# Patient Record
Sex: MALE | Race: WHITE | NOT HISPANIC OR LATINO | Employment: FULL TIME | URBAN - NONMETROPOLITAN AREA
[De-identification: names, ages, dates, MRNs, and addresses within clinical notes are randomized per-mention and may not be internally consistent; named-entity substitution may affect disease eponyms.]

---

## 2023-11-29 DIAGNOSIS — I10 ESSENTIAL HYPERTENSION: ICD-10-CM

## 2023-11-29 DIAGNOSIS — I49.3 PVC (PREMATURE VENTRICULAR CONTRACTION): ICD-10-CM

## 2023-11-29 PROBLEM — I42.9 CARDIOMYOPATHY (MULTI): Status: ACTIVE | Noted: 2023-11-29

## 2023-11-29 PROBLEM — E78.5 HYPERLIPIDEMIA: Status: ACTIVE | Noted: 2023-11-29

## 2023-11-29 RX ORDER — HYDROCHLOROTHIAZIDE 12.5 MG/1
1 TABLET ORAL DAILY
COMMUNITY
End: 2024-02-23 | Stop reason: SDUPTHER

## 2023-11-29 RX ORDER — ATENOLOL 50 MG/1
50 TABLET ORAL 2 TIMES DAILY
Qty: 180 TABLET | Refills: 0 | Status: SHIPPED | OUTPATIENT
Start: 2023-11-29 | End: 2024-02-23 | Stop reason: SDUPTHER

## 2023-11-29 RX ORDER — MILK THISTLE 500 MG
1 CAPSULE ORAL DAILY
COMMUNITY

## 2023-11-29 RX ORDER — LANOLIN ALCOHOL/MO/W.PET/CERES
1 CREAM (GRAM) TOPICAL 2 TIMES DAILY
COMMUNITY

## 2023-11-29 RX ORDER — SPIRONOLACTONE 50 MG/1
1 TABLET, FILM COATED ORAL DAILY
COMMUNITY
End: 2023-11-29 | Stop reason: SDUPTHER

## 2023-11-29 RX ORDER — HYDRALAZINE HYDROCHLORIDE 100 MG/1
1.5 TABLET, FILM COATED ORAL 2 TIMES DAILY
COMMUNITY
End: 2023-12-05 | Stop reason: SDUPTHER

## 2023-11-29 RX ORDER — TIZANIDINE 2 MG/1
1 TABLET ORAL AS NEEDED
COMMUNITY

## 2023-11-29 RX ORDER — MELOXICAM 15 MG/1
1 TABLET ORAL AS NEEDED
COMMUNITY

## 2023-11-29 RX ORDER — MULTIVIT-MIN/IRON/FOLIC ACID/K 18-600-40
1 CAPSULE ORAL DAILY
COMMUNITY

## 2023-11-29 RX ORDER — SPIRONOLACTONE 50 MG/1
50 TABLET, FILM COATED ORAL DAILY
Qty: 90 TABLET | Refills: 0 | Status: SHIPPED | OUTPATIENT
Start: 2023-11-29 | End: 2024-02-23 | Stop reason: SDUPTHER

## 2023-11-29 RX ORDER — OLMESARTAN MEDOXOMIL 40 MG/1
1 TABLET ORAL DAILY
COMMUNITY
End: 2023-12-12 | Stop reason: SDUPTHER

## 2023-11-29 RX ORDER — ATENOLOL 50 MG/1
1 TABLET ORAL 2 TIMES DAILY
COMMUNITY
End: 2023-11-29 | Stop reason: SDUPTHER

## 2023-11-29 RX ORDER — CLONIDINE HYDROCHLORIDE 0.2 MG/1
1 TABLET ORAL 2 TIMES DAILY
COMMUNITY
End: 2024-01-08 | Stop reason: SDUPTHER

## 2023-11-29 RX ORDER — CALCIUM CARBONATE 600 MG
1 TABLET ORAL DAILY
COMMUNITY

## 2023-11-29 RX ORDER — LUTEIN 6 MG
1 TABLET ORAL DAILY
COMMUNITY

## 2023-12-05 DIAGNOSIS — I10 ESSENTIAL HYPERTENSION: ICD-10-CM

## 2023-12-06 RX ORDER — HYDRALAZINE HYDROCHLORIDE 100 MG/1
150 TABLET, FILM COATED ORAL 2 TIMES DAILY
Qty: 270 TABLET | Refills: 0 | Status: SHIPPED | OUTPATIENT
Start: 2023-12-06 | End: 2024-02-23 | Stop reason: SDUPTHER

## 2023-12-12 DIAGNOSIS — I10 ESSENTIAL HYPERTENSION: ICD-10-CM

## 2023-12-12 RX ORDER — OLMESARTAN MEDOXOMIL 40 MG/1
40 TABLET ORAL DAILY
Qty: 90 TABLET | Refills: 0 | Status: SHIPPED | OUTPATIENT
Start: 2023-12-12 | End: 2024-02-23 | Stop reason: SDUPTHER

## 2024-01-08 DIAGNOSIS — I10 ESSENTIAL HYPERTENSION: ICD-10-CM

## 2024-01-08 RX ORDER — CLONIDINE HYDROCHLORIDE 0.2 MG/1
0.2 TABLET ORAL 2 TIMES DAILY
Qty: 180 TABLET | Refills: 3 | Status: SHIPPED | OUTPATIENT
Start: 2024-01-08 | End: 2024-02-23 | Stop reason: SDUPTHER

## 2024-02-23 ENCOUNTER — OFFICE VISIT (OUTPATIENT)
Dept: CARDIOLOGY | Facility: CLINIC | Age: 59
End: 2024-02-23
Payer: COMMERCIAL

## 2024-02-23 VITALS
HEART RATE: 60 BPM | BODY MASS INDEX: 42.06 KG/M2 | SYSTOLIC BLOOD PRESSURE: 130 MMHG | WEIGHT: 284 LBS | DIASTOLIC BLOOD PRESSURE: 70 MMHG | HEIGHT: 69 IN

## 2024-02-23 DIAGNOSIS — Z72.0 CHEWS TOBACCO: ICD-10-CM

## 2024-02-23 DIAGNOSIS — R60.0 BILATERAL LOWER EXTREMITY EDEMA: ICD-10-CM

## 2024-02-23 DIAGNOSIS — E66.01 MORBID OBESITY (MULTI): ICD-10-CM

## 2024-02-23 DIAGNOSIS — E78.5 HYPERLIPIDEMIA, UNSPECIFIED HYPERLIPIDEMIA TYPE: ICD-10-CM

## 2024-02-23 DIAGNOSIS — I42.9 CARDIOMYOPATHY, UNSPECIFIED TYPE (MULTI): ICD-10-CM

## 2024-02-23 DIAGNOSIS — I49.3 PVC (PREMATURE VENTRICULAR CONTRACTION): ICD-10-CM

## 2024-02-23 DIAGNOSIS — I10 ESSENTIAL HYPERTENSION: Primary | ICD-10-CM

## 2024-02-23 DIAGNOSIS — E87.1 HYPONATREMIA: ICD-10-CM

## 2024-02-23 PROCEDURE — 4004F PT TOBACCO SCREEN RCVD TLK: CPT | Performed by: INTERNAL MEDICINE

## 2024-02-23 PROCEDURE — 99214 OFFICE O/P EST MOD 30 MIN: CPT | Performed by: INTERNAL MEDICINE

## 2024-02-23 PROCEDURE — 3078F DIAST BP <80 MM HG: CPT | Performed by: INTERNAL MEDICINE

## 2024-02-23 PROCEDURE — 3075F SYST BP GE 130 - 139MM HG: CPT | Performed by: INTERNAL MEDICINE

## 2024-02-23 RX ORDER — HYDROCHLOROTHIAZIDE 12.5 MG/1
12.5 TABLET ORAL DAILY
Qty: 90 TABLET | Refills: 3 | Status: SHIPPED | OUTPATIENT
Start: 2024-02-23 | End: 2025-02-22

## 2024-02-23 RX ORDER — ATENOLOL 50 MG/1
50 TABLET ORAL 2 TIMES DAILY
Qty: 180 TABLET | Refills: 3 | Status: SHIPPED | OUTPATIENT
Start: 2024-02-23

## 2024-02-23 RX ORDER — OLMESARTAN MEDOXOMIL 40 MG/1
40 TABLET ORAL DAILY
Qty: 90 TABLET | Refills: 3 | Status: SHIPPED | OUTPATIENT
Start: 2024-02-23 | End: 2025-02-22

## 2024-02-23 RX ORDER — HYDRALAZINE HYDROCHLORIDE 100 MG/1
150 TABLET, FILM COATED ORAL 2 TIMES DAILY
Qty: 270 TABLET | Refills: 3 | Status: SHIPPED | OUTPATIENT
Start: 2024-02-23

## 2024-02-23 RX ORDER — CLONIDINE HYDROCHLORIDE 0.2 MG/1
0.2 TABLET ORAL 2 TIMES DAILY
Qty: 180 TABLET | Refills: 3 | Status: SHIPPED | OUTPATIENT
Start: 2024-02-23

## 2024-02-23 RX ORDER — SPIRONOLACTONE 50 MG/1
50 TABLET, FILM COATED ORAL DAILY
Qty: 90 TABLET | Refills: 3 | Status: SHIPPED | OUTPATIENT
Start: 2024-02-23 | End: 2025-02-22

## 2024-02-23 NOTE — PATIENT INSTRUCTIONS
Please bring all medicines, vitamins, and herbal supplements with you when you come to the office.    Prescriptions will not be filled unless you are compliant with your follow up appointments or have a follow up appointment scheduled as per instruction of your physician. Refills should be requested at the time of your visit.     BMI was above normal measurement. Current weight: 129 kg (284 lb)  Weight change since last visit (-) denotes wt loss 12 lbs   Weight loss needed to achieve BMI 25: 115.1 Lbs  Weight loss needed to achieve BMI 30: 81.3 Lbs  Provided instructions on dietary changes  Provided instructions on exercise  Advised to Increase physical activity    Follow up one year  Lab work  .

## 2024-02-23 NOTE — PROGRESS NOTES
"Subjective   James Newby is a 58 y.o. male       Chief Complaint    Follow-up          HPI   Patient is in the office for annual follow-up for the problems noted below.  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  He is doing well without any cardiac complaints.  He does have lower extremity edema which I believe is not related to cardiac issues.  He is morbidly obese with a few pounds increase in his weight from last visit.  He is due for blood work which is scheduled.  His medications were reviewed which have been effective and well-tolerated.    Assessment/recommendations:     1-resistant hypertension on multiple medications to control. No changes are needed, the regimen has been well-tolerated.  Basic metabolic profile is ordered  2-morbid obesity discussed with the patient low-calorie diet and more exercise to lose weight.  3-tobacco abuse in terms of chewing tobacco, he was advised against this habit. Patient has no desire to quit  4-previous nonischemic cardiopathy with complete recovery  5-hyperlipidemia managed with diet only.   6-stigmata suggestive of sleep apnea. Patient is not interested in pursuing sleep study.  Medication renewed and lab data were ordered,  7-lower extremity edema unrelated to cardiac issues, mostly related to prolonged standing and his morbid obesity.  No diuretics beyond hydrochlorothiazide report will be provided since he has tendency for hyponatremia   annual visit will be scheduled  Review of Systems   All other systems reviewed and are negative.           Vitals:    02/23/24 0906 02/23/24 0926   BP: 150/78 130/70   BP Location: Right arm    Patient Position: Sitting    Pulse: 60    Weight: 129 kg (284 lb)    Height: 1.753 m (5' 9\")         Objective   Physical Exam  Constitutional:       Appearance: Normal appearance. He is normal weight.   HENT:      Nose: Nose normal.   Neck:      Vascular: No carotid bruit. "   Cardiovascular:      Rate and Rhythm: Normal rate.      Pulses: Normal pulses.      Heart sounds: Normal heart sounds.   Pulmonary:      Effort: Pulmonary effort is normal.   Abdominal:      General: Bowel sounds are normal.      Palpations: Abdomen is soft.   Genitourinary:     Rectum: Normal.   Musculoskeletal:         General: Normal range of motion.      Cervical back: Normal range of motion.      Right lower le+ Edema present.      Left lower le+ Edema present.   Skin:     General: Skin is warm and dry.   Neurological:      General: No focal deficit present.      Mental Status: He is alert.   Psychiatric:         Mood and Affect: Mood normal.         Behavior: Behavior normal.         Thought Content: Thought content normal.         Judgment: Judgment normal.         Allergies  Amlodipine, Aspirin, Bismuth subsalicylate, and Carvedilol     Current Medications    Current Outpatient Medications:     ascorbic acid, vitamin C, 500 mg capsule, Take 1 capsule by mouth once daily., Disp: , Rfl:     calcium carbonate 600 mg calcium (1,500 mg) tablet, Take 1 tablet (1,500 mg) by mouth once daily., Disp: , Rfl:     magnesium oxide (Mag-Ox) 400 mg (241.3 mg magnesium) tablet, Take 1 tablet (400 mg) by mouth 2 times a day., Disp: , Rfl:     meloxicam (Mobic) 15 mg tablet, Take 1 tablet (15 mg) by mouth if needed., Disp: , Rfl:     milk thistle 500 mg capsule, Take 1 capsule by mouth once daily., Disp: , Rfl:     multivit-min/ferrous fumarate (MULTI VITAMIN ORAL), Take 1 tablet by mouth once daily., Disp: , Rfl:     tiZANidine (Zanaflex) 2 mg tablet, Take 1 tablet (2 mg) by mouth if needed., Disp: , Rfl:     vitamin E acid succinate (vitamin E succinate) 268 mg (400 unit) tablet, Take 1 tablet by mouth once daily., Disp: , Rfl:     atenolol (Tenormin) 50 mg tablet, Take 1 tablet (50 mg) by mouth 2 times a day., Disp: 180 tablet, Rfl: 3    cloNIDine (Catapres) 0.2 mg tablet, Take 1 tablet (0.2 mg) by mouth 2 times  a day., Disp: 180 tablet, Rfl: 3    hydrALAZINE (Apresoline) 100 mg tablet, Take 1.5 tablets (150 mg) by mouth 2 times a day., Disp: 270 tablet, Rfl: 3    hydroCHLOROthiazide (HYDRODiuril) 12.5 mg tablet, Take 1 tablet (12.5 mg) by mouth once daily., Disp: 90 tablet, Rfl: 3    olmesartan (BENIcar) 40 mg tablet, Take 1 tablet (40 mg) by mouth once daily., Disp: 90 tablet, Rfl: 3    spironolactone (Aldactone) 50 mg tablet, Take 1 tablet (50 mg) by mouth once daily., Disp: 90 tablet, Rfl: 3                     Assessment/Plan   1. Essential hypertension  Basic Metabolic Panel    CBC    Follow Up In Cardiology    atenolol (Tenormin) 50 mg tablet    cloNIDine (Catapres) 0.2 mg tablet    hydrALAZINE (Apresoline) 100 mg tablet    hydroCHLOROthiazide (HYDRODiuril) 12.5 mg tablet    olmesartan (BENIcar) 40 mg tablet    spironolactone (Aldactone) 50 mg tablet      2. Cardiomyopathy, unspecified type (CMS/HCC)  Basic Metabolic Panel    CBC    Follow Up In Cardiology    hydroCHLOROthiazide (HYDRODiuril) 12.5 mg tablet      3. PVC (premature ventricular contraction)  Thyroid Stimulating Hormone    atenolol (Tenormin) 50 mg tablet      4. Hyperlipidemia, unspecified hyperlipidemia type  Thyroid Stimulating Hormone      5. Chews tobacco                 Scribe Attestation  By signing my name below, Iuma Scribe   attest that this documentation has been prepared under the direction and in the presence of Grabiel Ford MD.     Provider Attestation - Scribe documentation    All medical record entries made by the Scribe were at my direction and personally dictated by me. I have reviewed the chart and agree that the record accurately reflects my personal performance of the history, physical exam, discussion and plan.

## 2024-02-23 NOTE — LETTER
February 23, 2024     Errol Gaines DO  [address] [facility] [address] [facility] [address]  [facility] [address]    Patient: James Newby   YOB: 1965   Date of Visit: 2/23/2024       Dear Dr. Errol Gaines DO:    Thank you for referring James Newby to me for evaluation. Below are my notes for this consultation.  If you have questions, please do not hesitate to call me. I look forward to following your patient along with you.       Sincerely,     Grabiel Ford MD      CC: No Recipients  ______________________________________________________________________________________    Subjective   James Newby is a 58 y.o. male       Chief Complaint    Follow-up          HPI   Patient is in the office for annual follow-up for the problems noted below.  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  He is doing well without any cardiac complaints.  He does have lower extremity edema which I believe is not related to cardiac issues.  He is morbidly obese with a few pounds increase in his weight from last visit.  He is due for blood work which is scheduled.  His medications were reviewed which have been effective and well-tolerated.    Assessment/recommendations:     1-resistant hypertension on multiple medications to control. No changes are needed, the regimen has been well-tolerated.  Basic metabolic profile is ordered  2-morbid obesity discussed with the patient low-calorie diet and more exercise to lose weight.  3-tobacco abuse in terms of chewing tobacco, he was advised against this habit. Patient has no desire to quit  4-previous nonischemic cardiopathy with complete recovery  5-hyperlipidemia managed with diet only.   6-stigmata suggestive of sleep apnea. Patient is not interested in pursuing sleep study.  Medication renewed and lab data were ordered,  7-lower extremity edema unrelated to  "cardiac issues, mostly related to prolonged standing and his morbid obesity.  No diuretics beyond hydrochlorothiazide report will be provided since he has tendency for hyponatremia   annual visit will be scheduled  Review of Systems   All other systems reviewed and are negative.           Vitals:    24 0906 24 0926   BP: 150/78 130/70   BP Location: Right arm    Patient Position: Sitting    Pulse: 60    Weight: 129 kg (284 lb)    Height: 1.753 m (5' 9\")         Objective   Physical Exam  Constitutional:       Appearance: Normal appearance. He is normal weight.   HENT:      Nose: Nose normal.   Neck:      Vascular: No carotid bruit.   Cardiovascular:      Rate and Rhythm: Normal rate.      Pulses: Normal pulses.      Heart sounds: Normal heart sounds.   Pulmonary:      Effort: Pulmonary effort is normal.   Abdominal:      General: Bowel sounds are normal.      Palpations: Abdomen is soft.   Genitourinary:     Rectum: Normal.   Musculoskeletal:         General: Normal range of motion.      Cervical back: Normal range of motion.      Right lower le+ Edema present.      Left lower le+ Edema present.   Skin:     General: Skin is warm and dry.   Neurological:      General: No focal deficit present.      Mental Status: He is alert.   Psychiatric:         Mood and Affect: Mood normal.         Behavior: Behavior normal.         Thought Content: Thought content normal.         Judgment: Judgment normal.         Allergies  Amlodipine, Aspirin, Bismuth subsalicylate, and Carvedilol     Current Medications    Current Outpatient Medications:   •  ascorbic acid, vitamin C, 500 mg capsule, Take 1 capsule by mouth once daily., Disp: , Rfl:   •  calcium carbonate 600 mg calcium (1,500 mg) tablet, Take 1 tablet (1,500 mg) by mouth once daily., Disp: , Rfl:   •  magnesium oxide (Mag-Ox) 400 mg (241.3 mg magnesium) tablet, Take 1 tablet (400 mg) by mouth 2 times a day., Disp: , Rfl:   •  meloxicam (Mobic) 15 mg " tablet, Take 1 tablet (15 mg) by mouth if needed., Disp: , Rfl:   •  milk thistle 500 mg capsule, Take 1 capsule by mouth once daily., Disp: , Rfl:   •  multivit-min/ferrous fumarate (MULTI VITAMIN ORAL), Take 1 tablet by mouth once daily., Disp: , Rfl:   •  tiZANidine (Zanaflex) 2 mg tablet, Take 1 tablet (2 mg) by mouth if needed., Disp: , Rfl:   •  vitamin E acid succinate (vitamin E succinate) 268 mg (400 unit) tablet, Take 1 tablet by mouth once daily., Disp: , Rfl:   •  atenolol (Tenormin) 50 mg tablet, Take 1 tablet (50 mg) by mouth 2 times a day., Disp: 180 tablet, Rfl: 3  •  cloNIDine (Catapres) 0.2 mg tablet, Take 1 tablet (0.2 mg) by mouth 2 times a day., Disp: 180 tablet, Rfl: 3  •  hydrALAZINE (Apresoline) 100 mg tablet, Take 1.5 tablets (150 mg) by mouth 2 times a day., Disp: 270 tablet, Rfl: 3  •  hydroCHLOROthiazide (HYDRODiuril) 12.5 mg tablet, Take 1 tablet (12.5 mg) by mouth once daily., Disp: 90 tablet, Rfl: 3  •  olmesartan (BENIcar) 40 mg tablet, Take 1 tablet (40 mg) by mouth once daily., Disp: 90 tablet, Rfl: 3  •  spironolactone (Aldactone) 50 mg tablet, Take 1 tablet (50 mg) by mouth once daily., Disp: 90 tablet, Rfl: 3                     Assessment/Plan   1. Essential hypertension  Basic Metabolic Panel    CBC    Follow Up In Cardiology    atenolol (Tenormin) 50 mg tablet    cloNIDine (Catapres) 0.2 mg tablet    hydrALAZINE (Apresoline) 100 mg tablet    hydroCHLOROthiazide (HYDRODiuril) 12.5 mg tablet    olmesartan (BENIcar) 40 mg tablet    spironolactone (Aldactone) 50 mg tablet      2. Cardiomyopathy, unspecified type (CMS/HCC)  Basic Metabolic Panel    CBC    Follow Up In Cardiology    hydroCHLOROthiazide (HYDRODiuril) 12.5 mg tablet      3. PVC (premature ventricular contraction)  Thyroid Stimulating Hormone    atenolol (Tenormin) 50 mg tablet      4. Hyperlipidemia, unspecified hyperlipidemia type  Thyroid Stimulating Hormone      5. Chews tobacco                 Scribe  Attestation  By signing my name below, I, Esteban eaton   attest that this documentation has been prepared under the direction and in the presence of Grabiel Ford MD.     Provider Attestation - Scribe documentation    All medical record entries made by the Scribe were at my direction and personally dictated by me. I have reviewed the chart and agree that the record accurately reflects my personal performance of the history, physical exam, discussion and plan.

## 2024-04-20 LAB
NON-UH HIE ANION GAP:SCNC:PT:SER/PLAS:QN:: 12 MEQ/L (ref 6–16)
NON-UH HIE CALCIUM:MCNC:PT:SER/PLAS:QN:: 9.5 MG/DL (ref 8.9–11.1)
NON-UH HIE CARBON DIOXIDE:SCNC:PT:SER/PLAS:QN:: 24 MMOL/L (ref 21–31)
NON-UH HIE CHLORIDE:SCNC:PT:SER/PLAS:QN:: 103 MMOL/L (ref 101–111)
NON-UH HIE CREATININE:MCNC:PT:SER/PLAS:QN:: 1.2 MG/DL (ref 0.5–1.3)
NON-UH HIE EGFR: 69 ML/MIN/1.73 M2
NON-UH HIE ERYTHROCYTE DISTRIBUTION WIDTH:RATIO:PT:RBC:QN:AUTOMATED COUNT: 13.6 % (ref 10.9–14.2)
NON-UH HIE ERYTHROCYTE MEAN CORPUSCULAR HEMOGLOBIN CONCENTRATION:MCNC:PT:RB: 32.7 GM/DL (ref 31.4–36)
NON-UH HIE ERYTHROCYTE MEAN CORPUSCULAR HEMOGLOBIN:ENTMASS:PT:RBC:QN:AUTOMA: 29.7 PG (ref 27–34)
NON-UH HIE ERYTHROCYTE MEAN CORPUSCULAR VOLUME:ENTVOL:PT:RBC:QN:AUTOMATED C: 91 FL (ref 80–100)
NON-UH HIE ERYTHROCYTE SIZE:MORPH:PT:BLD:NOM:: NORMAL
NON-UH HIE ERYTHROCYTES:NCNC:PT:BLD:QN:AUTOMATED COUNT: 4 E12/L (ref 4.3–5.9)
NON-UH HIE GLUCOSE:MCNC:PT:SER/PLAS:QN:: 80 MG/DL (ref 55–199)
NON-UH HIE HEMATOCRIT:VFR:PT:BLD:QN:AUTOMATED COUNT: 36.7 % (ref 37.7–49)
NON-UH HIE HEMOGLOBIN:MCNC:PT:BLD:QN:: 12 GM/DL (ref 13.5–17.5)
NON-UH HIE LEUKOCYTES: 4.8 E9/L (ref 4–11)
NON-UH HIE PLATELET MEAN VOLUME:ENTVOL:PT:BLD:QN:AUTOMATED COUNT: 7.1 FL (ref 6.4–10.8)
NON-UH HIE PLATELET: 240 E9/L (ref 150–500)
NON-UH HIE POTASSIUM:SCNC:PT:SER/PLAS:QN:: 3.9 MMOL/L (ref 3.5–5.3)
NON-UH HIE SODIUM:SCNC:PT:SER/PLAS:QN:: 135 MMOL/L (ref 135–145)
NON-UH HIE THYROTROPIN:ACNC:PT:SER/PLAS:QN:: 2.33 MCIU/ML (ref 0.34–5.6)
NON-UH HIE UREA NITROGEN/CREATININE:MRTO:PT:SER/PLAS:QN:: 26 NO UNITS (ref 10–20)
NON-UH HIE UREA NITROGEN:MCNC:PT:SER/PLAS:QN:: 31 MG/DL (ref 5–21)

## 2024-12-02 DIAGNOSIS — I49.3 PVC (PREMATURE VENTRICULAR CONTRACTION): ICD-10-CM

## 2024-12-02 DIAGNOSIS — I10 ESSENTIAL HYPERTENSION: ICD-10-CM

## 2024-12-02 DIAGNOSIS — I42.9 CARDIOMYOPATHY, UNSPECIFIED TYPE (MULTI): ICD-10-CM

## 2024-12-02 RX ORDER — OLMESARTAN MEDOXOMIL 40 MG/1
40 TABLET ORAL DAILY
Qty: 90 TABLET | Refills: 1 | Status: SHIPPED | OUTPATIENT
Start: 2024-12-02 | End: 2025-12-02

## 2024-12-02 RX ORDER — ATENOLOL 50 MG/1
50 TABLET ORAL 2 TIMES DAILY
Qty: 180 TABLET | Refills: 1 | Status: SHIPPED | OUTPATIENT
Start: 2024-12-02

## 2024-12-02 RX ORDER — CLONIDINE HYDROCHLORIDE 0.2 MG/1
0.2 TABLET ORAL 2 TIMES DAILY
Qty: 180 TABLET | Refills: 1 | Status: SHIPPED | OUTPATIENT
Start: 2024-12-02

## 2024-12-02 RX ORDER — HYDRALAZINE HYDROCHLORIDE 100 MG/1
150 TABLET, FILM COATED ORAL 2 TIMES DAILY
Qty: 270 TABLET | Refills: 1 | Status: SHIPPED | OUTPATIENT
Start: 2024-12-02

## 2024-12-02 RX ORDER — HYDROCHLOROTHIAZIDE 12.5 MG/1
12.5 TABLET ORAL DAILY
Qty: 90 TABLET | Refills: 1 | Status: SHIPPED | OUTPATIENT
Start: 2024-12-02 | End: 2025-12-02

## 2024-12-02 RX ORDER — SPIRONOLACTONE 50 MG/1
50 TABLET, FILM COATED ORAL DAILY
Qty: 90 TABLET | Refills: 1 | Status: SHIPPED | OUTPATIENT
Start: 2024-12-02 | End: 2025-12-02

## 2025-02-19 ENCOUNTER — APPOINTMENT (OUTPATIENT)
Dept: CARDIOLOGY | Facility: CLINIC | Age: 60
End: 2025-02-19
Payer: COMMERCIAL

## 2025-02-19 VITALS
HEIGHT: 69 IN | WEIGHT: 287 LBS | HEART RATE: 66 BPM | BODY MASS INDEX: 42.51 KG/M2 | DIASTOLIC BLOOD PRESSURE: 70 MMHG | SYSTOLIC BLOOD PRESSURE: 138 MMHG

## 2025-02-19 DIAGNOSIS — E66.01 MORBID OBESITY WITH BMI OF 40.0-44.9, ADULT (MULTI): ICD-10-CM

## 2025-02-19 DIAGNOSIS — Z72.0 CHEWS TOBACCO: ICD-10-CM

## 2025-02-19 DIAGNOSIS — E78.5 HYPERLIPIDEMIA, UNSPECIFIED HYPERLIPIDEMIA TYPE: ICD-10-CM

## 2025-02-19 DIAGNOSIS — I42.9 CARDIOMYOPATHY, UNSPECIFIED TYPE (MULTI): ICD-10-CM

## 2025-02-19 DIAGNOSIS — I49.3 PVC (PREMATURE VENTRICULAR CONTRACTION): ICD-10-CM

## 2025-02-19 DIAGNOSIS — I10 ESSENTIAL HYPERTENSION: ICD-10-CM

## 2025-02-19 PROCEDURE — 99214 OFFICE O/P EST MOD 30 MIN: CPT | Performed by: INTERNAL MEDICINE

## 2025-02-19 PROCEDURE — 3078F DIAST BP <80 MM HG: CPT | Performed by: INTERNAL MEDICINE

## 2025-02-19 PROCEDURE — 3008F BODY MASS INDEX DOCD: CPT | Performed by: INTERNAL MEDICINE

## 2025-02-19 PROCEDURE — 4004F PT TOBACCO SCREEN RCVD TLK: CPT | Performed by: INTERNAL MEDICINE

## 2025-02-19 PROCEDURE — 3075F SYST BP GE 130 - 139MM HG: CPT | Performed by: INTERNAL MEDICINE

## 2025-02-19 RX ORDER — CLONIDINE HYDROCHLORIDE 0.2 MG/1
0.2 TABLET ORAL 2 TIMES DAILY
Qty: 180 TABLET | Refills: 3 | Status: SHIPPED | OUTPATIENT
Start: 2025-02-19

## 2025-02-19 RX ORDER — ATENOLOL 50 MG/1
50 TABLET ORAL 2 TIMES DAILY
Qty: 180 TABLET | Refills: 3 | Status: SHIPPED | OUTPATIENT
Start: 2025-02-19

## 2025-02-19 RX ORDER — HYDRALAZINE HYDROCHLORIDE 100 MG/1
150 TABLET, FILM COATED ORAL 2 TIMES DAILY
Qty: 270 TABLET | Refills: 3 | Status: SHIPPED | OUTPATIENT
Start: 2025-02-19

## 2025-02-19 RX ORDER — HYDROCHLOROTHIAZIDE 12.5 MG/1
12.5 TABLET ORAL DAILY
Qty: 90 TABLET | Refills: 3 | Status: SHIPPED | OUTPATIENT
Start: 2025-02-19 | End: 2026-02-19

## 2025-02-19 RX ORDER — OLMESARTAN MEDOXOMIL 40 MG/1
40 TABLET ORAL DAILY
Qty: 90 TABLET | Refills: 2 | Status: SHIPPED | OUTPATIENT
Start: 2025-02-19 | End: 2026-02-19

## 2025-02-19 NOTE — LETTER
February 19, 2025     Errol Gaines DO  [address] [facility] [address] [facility] [address]  [facility] [address]    Patient: James Newby   YOB: 1965   Date of Visit: 2/19/2025       Dear Dr. Errol Gaines DO:    Thank you for referring James Newby to me for evaluation. Below are my notes for this consultation.  If you have questions, please do not hesitate to call me. I look forward to following your patient along with you.       Sincerely,     Grabiel Ford MD      CC: No Recipients  ______________________________________________________________________________________    Subjective   James Newby is a 59 y.o. male       Chief Complaint    Annual Exam          HPI   Patient is in the office for follow-up for the problems noted below.  He comes for annual visit and sees his PCP regularly.  Since his last visit he has had no events whatsoever.  He continues to chew tobacco and has no desire to quit.  His blood pressure is under control.  He is on multiple medication which have been well-tolerated.  He is suspected to have sleep apnea but he does not wish to proceed with any testing.  He does not check his blood pressure at home.  He tried to follow a low calorie diet and low-salt diet but this has not caused any drop in his weight.  His lungs sounded clear his card examination was normal.  He has no side effect of medications.  He is scheduled to have extensive blood work ordered by his PCP coming up soon.    Assessment/recommendations:     1-essential hypertension on multiple medications under control. No changes are needed, the regimen has been well-tolerated.  Basic metabolic profile is ordered  2-morbid obesity discussed with the patient low-calorie diet and more exercise to lose weight.  3-tobacco abuse in terms of chewing tobacco, he was advised against this habit. Patient has no desire to quit  4-previous nonischemic cardiopathy with  "complete recovery, last echocardiogram 2017  5-hyperlipidemia managed with diet only.  Lipid profile was ordered by his PCP coming up soon  6-stigmata suggestive of sleep apnea. Patient is not interested in pursuing sleep study.    ROS         Vitals:    02/19/25 1526 02/19/25 1533   BP: (!) 142/92 138/70   BP Location: Right arm    Patient Position: Sitting    Pulse: 66    Weight: 130 kg (287 lb)    Height: 1.753 m (5' 9\")         Objective   Physical Exam  Constitutional:       Appearance: Normal appearance.   HENT:      Nose: Nose normal.   Neck:      Vascular: No carotid bruit.   Cardiovascular:      Rate and Rhythm: Normal rate.      Pulses: Normal pulses.      Heart sounds: Normal heart sounds.   Pulmonary:      Effort: Pulmonary effort is normal.   Abdominal:      General: Bowel sounds are normal.      Palpations: Abdomen is soft.   Musculoskeletal:         General: Normal range of motion.      Cervical back: Normal range of motion.      Right lower leg: No edema.      Left lower leg: No edema.   Skin:     General: Skin is warm and dry.   Neurological:      General: No focal deficit present.      Mental Status: He is alert.   Psychiatric:         Mood and Affect: Mood normal.         Behavior: Behavior normal.         Thought Content: Thought content normal.         Judgment: Judgment normal.         Allergies  Amlodipine, Aspirin, Bismuth subsalicylate, and Carvedilol     Current Medications    Current Outpatient Medications:   •  ascorbic acid, vitamin C, 500 mg capsule, Take 1 capsule by mouth once daily., Disp: , Rfl:   •  calcium carbonate 600 mg calcium (1,500 mg) tablet, Take 1 tablet (1,500 mg) by mouth once daily., Disp: , Rfl:   •  magnesium oxide (Mag-Ox) 400 mg (241.3 mg magnesium) tablet, Take 1 tablet (400 mg) by mouth 2 times a day., Disp: , Rfl:   •  milk thistle 500 mg capsule, Take 1 capsule by mouth once daily., Disp: , Rfl:   •  multivit-min/ferrous fumarate (MULTI VITAMIN ORAL), Take 1 " tablet by mouth once daily., Disp: , Rfl:   •  spironolactone (Aldactone) 50 mg tablet, Take 1 tablet (50 mg) by mouth once daily., Disp: 90 tablet, Rfl: 1  •  tiZANidine (Zanaflex) 2 mg tablet, Take 1 tablet (2 mg) by mouth if needed., Disp: , Rfl:   •  vitamin E acid succinate (vitamin E succinate) 268 mg (400 unit) tablet, Take 1 tablet by mouth once daily., Disp: , Rfl:   •  atenolol (Tenormin) 50 mg tablet, Take 1 tablet (50 mg) by mouth 2 times a day., Disp: 180 tablet, Rfl: 3  •  cloNIDine (Catapres) 0.2 mg tablet, Take 1 tablet (0.2 mg) by mouth 2 times a day., Disp: 180 tablet, Rfl: 3  •  hydrALAZINE (Apresoline) 100 mg tablet, Take 1.5 tablets (150 mg) by mouth 2 times a day., Disp: 270 tablet, Rfl: 3  •  hydroCHLOROthiazide (Microzide) 12.5 mg tablet, Take 1 tablet (12.5 mg) by mouth once daily., Disp: 90 tablet, Rfl: 3  •  olmesartan (BENIcar) 40 mg tablet, Take 1 tablet (40 mg) by mouth once daily., Disp: 90 tablet, Rfl: 2                     Assessment/Plan   1. Essential hypertension  Follow Up In Cardiology    Follow Up In Cardiology    atenolol (Tenormin) 50 mg tablet    cloNIDine (Catapres) 0.2 mg tablet    hydrALAZINE (Apresoline) 100 mg tablet    hydroCHLOROthiazide (Microzide) 12.5 mg tablet    olmesartan (BENIcar) 40 mg tablet      2. Cardiomyopathy, unspecified type (Multi)  Follow Up In Cardiology    hydroCHLOROthiazide (Microzide) 12.5 mg tablet      3. Hyperlipidemia, unspecified hyperlipidemia type        4. PVC (premature ventricular contraction)  atenolol (Tenormin) 50 mg tablet      5. Morbid obesity with BMI of 40.0-44.9, adult (Multi)        6. Chews tobacco                 Scribe Attestation  By signing my name below, Jeanette SIMMONS LPN   , [employer]   attest that this documentation has been prepared under the direction and in the presence of Grabiel Ford MD.     Provider Attestation - Scribe documentation    All medical record entries made by the Scribe were at my direction and  personally dictated by me. I have reviewed the chart and agree that the record accurately reflects my personal performance of the history, physical exam, discussion and plan.

## 2025-02-19 NOTE — PROGRESS NOTES
"Jesse Newby is a 59 y.o. male       Chief Complaint    Annual Exam          HPI   Patient is in the office for follow-up for the problems noted below.  He comes for annual visit and sees his PCP regularly.  Since his last visit he has had no events whatsoever.  He continues to chew tobacco and has no desire to quit.  His blood pressure is under control.  He is on multiple medication which have been well-tolerated.  He is suspected to have sleep apnea but he does not wish to proceed with any testing.  He does not check his blood pressure at home.  He tried to follow a low calorie diet and low-salt diet but this has not caused any drop in his weight.  His lungs sounded clear his card examination was normal.  He has no side effect of medications.  He is scheduled to have extensive blood work ordered by his PCP coming up soon.    Assessment/recommendations:     1-essential hypertension on multiple medications under control. No changes are needed, the regimen has been well-tolerated.  Basic metabolic profile is ordered  2-morbid obesity discussed with the patient low-calorie diet and more exercise to lose weight.  3-tobacco abuse in terms of chewing tobacco, he was advised against this habit. Patient has no desire to quit  4-previous nonischemic cardiopathy with complete recovery, last echocardiogram 2017  5-hyperlipidemia managed with diet only.  Lipid profile was ordered by his PCP coming up soon  6-stigmata suggestive of sleep apnea. Patient is not interested in pursuing sleep study.    ROS         Vitals:    02/19/25 1526 02/19/25 1533   BP: (!) 142/92 138/70   BP Location: Right arm    Patient Position: Sitting    Pulse: 66    Weight: 130 kg (287 lb)    Height: 1.753 m (5' 9\")         Objective   Physical Exam  Constitutional:       Appearance: Normal appearance.   HENT:      Nose: Nose normal.   Neck:      Vascular: No carotid bruit.   Cardiovascular:      Rate and Rhythm: Normal rate.      Pulses: " Normal pulses.      Heart sounds: Normal heart sounds.   Pulmonary:      Effort: Pulmonary effort is normal.   Abdominal:      General: Bowel sounds are normal.      Palpations: Abdomen is soft.   Musculoskeletal:         General: Normal range of motion.      Cervical back: Normal range of motion.      Right lower leg: No edema.      Left lower leg: No edema.   Skin:     General: Skin is warm and dry.   Neurological:      General: No focal deficit present.      Mental Status: He is alert.   Psychiatric:         Mood and Affect: Mood normal.         Behavior: Behavior normal.         Thought Content: Thought content normal.         Judgment: Judgment normal.         Allergies  Amlodipine, Aspirin, Bismuth subsalicylate, and Carvedilol     Current Medications    Current Outpatient Medications:     ascorbic acid, vitamin C, 500 mg capsule, Take 1 capsule by mouth once daily., Disp: , Rfl:     calcium carbonate 600 mg calcium (1,500 mg) tablet, Take 1 tablet (1,500 mg) by mouth once daily., Disp: , Rfl:     magnesium oxide (Mag-Ox) 400 mg (241.3 mg magnesium) tablet, Take 1 tablet (400 mg) by mouth 2 times a day., Disp: , Rfl:     milk thistle 500 mg capsule, Take 1 capsule by mouth once daily., Disp: , Rfl:     multivit-min/ferrous fumarate (MULTI VITAMIN ORAL), Take 1 tablet by mouth once daily., Disp: , Rfl:     spironolactone (Aldactone) 50 mg tablet, Take 1 tablet (50 mg) by mouth once daily., Disp: 90 tablet, Rfl: 1    tiZANidine (Zanaflex) 2 mg tablet, Take 1 tablet (2 mg) by mouth if needed., Disp: , Rfl:     vitamin E acid succinate (vitamin E succinate) 268 mg (400 unit) tablet, Take 1 tablet by mouth once daily., Disp: , Rfl:     atenolol (Tenormin) 50 mg tablet, Take 1 tablet (50 mg) by mouth 2 times a day., Disp: 180 tablet, Rfl: 3    cloNIDine (Catapres) 0.2 mg tablet, Take 1 tablet (0.2 mg) by mouth 2 times a day., Disp: 180 tablet, Rfl: 3    hydrALAZINE (Apresoline) 100 mg tablet, Take 1.5 tablets (150  mg) by mouth 2 times a day., Disp: 270 tablet, Rfl: 3    hydroCHLOROthiazide (Microzide) 12.5 mg tablet, Take 1 tablet (12.5 mg) by mouth once daily., Disp: 90 tablet, Rfl: 3    olmesartan (BENIcar) 40 mg tablet, Take 1 tablet (40 mg) by mouth once daily., Disp: 90 tablet, Rfl: 2                     Assessment/Plan   1. Essential hypertension  Follow Up In Cardiology    Follow Up In Cardiology    atenolol (Tenormin) 50 mg tablet    cloNIDine (Catapres) 0.2 mg tablet    hydrALAZINE (Apresoline) 100 mg tablet    hydroCHLOROthiazide (Microzide) 12.5 mg tablet    olmesartan (BENIcar) 40 mg tablet      2. Cardiomyopathy, unspecified type (Multi)  Follow Up In Cardiology    hydroCHLOROthiazide (Microzide) 12.5 mg tablet      3. Hyperlipidemia, unspecified hyperlipidemia type        4. PVC (premature ventricular contraction)  atenolol (Tenormin) 50 mg tablet      5. Morbid obesity with BMI of 40.0-44.9, adult (Multi)        6. Chews tobacco                 Scribe Attestation  By signing my name below, Jeanette SIMMONS LPN, Scribe   attest that this documentation has been prepared under the direction and in the presence of Grabiel Ford MD.     Provider Attestation - Scribe documentation    All medical record entries made by the Scribe were at my direction and personally dictated by me. I have reviewed the chart and agree that the record accurately reflects my personal performance of the history, physical exam, discussion and plan.

## 2025-02-21 ENCOUNTER — APPOINTMENT (OUTPATIENT)
Dept: CARDIOLOGY | Facility: CLINIC | Age: 60
End: 2025-02-21
Payer: COMMERCIAL

## 2025-07-01 DIAGNOSIS — I10 ESSENTIAL HYPERTENSION: ICD-10-CM

## 2025-07-01 RX ORDER — CLONIDINE HYDROCHLORIDE 0.2 MG/1
TABLET ORAL
Qty: 180 TABLET | Refills: 3 | Status: SHIPPED | OUTPATIENT
Start: 2025-07-01

## 2025-09-01 DIAGNOSIS — I42.9 CARDIOMYOPATHY, UNSPECIFIED TYPE (MULTI): ICD-10-CM

## 2025-09-01 DIAGNOSIS — I10 ESSENTIAL HYPERTENSION: ICD-10-CM

## 2025-09-02 RX ORDER — SPIRONOLACTONE 50 MG/1
50 TABLET, FILM COATED ORAL DAILY
Qty: 90 TABLET | Refills: 3 | Status: SHIPPED | OUTPATIENT
Start: 2025-09-02 | End: 2026-09-02

## 2026-02-20 ENCOUNTER — APPOINTMENT (OUTPATIENT)
Dept: CARDIOLOGY | Facility: CLINIC | Age: 61
End: 2026-02-20
Payer: COMMERCIAL